# Patient Record
Sex: FEMALE | Race: WHITE | Employment: UNEMPLOYED | URBAN - METROPOLITAN AREA
[De-identification: names, ages, dates, MRNs, and addresses within clinical notes are randomized per-mention and may not be internally consistent; named-entity substitution may affect disease eponyms.]

---

## 2017-06-10 ENCOUNTER — OFFICE VISIT (OUTPATIENT)
Dept: OPHTHALMOLOGY | Facility: CLINIC | Age: 67
End: 2017-06-10

## 2017-06-10 DIAGNOSIS — H25.13 AGE-RELATED NUCLEAR CATARACT OF BOTH EYES: Primary | ICD-10-CM

## 2017-06-10 DIAGNOSIS — H43.393 VITREOUS FLOATERS OF BOTH EYES: ICD-10-CM

## 2017-06-10 PROCEDURE — 92014 COMPRE OPH EXAM EST PT 1/>: CPT | Performed by: OPHTHALMOLOGY

## 2017-06-10 RX ORDER — CHOLECALCIFEROL (VITAMIN D3) 1250 MCG
CAPSULE ORAL
COMMUNITY

## 2017-06-10 RX ORDER — THIAMINE HCL 100 MG
TABLET ORAL
COMMUNITY

## 2017-06-10 RX ORDER — CYANOCOBALAMIN (VITAMIN B-12) 2000 MCG
TABLET ORAL
COMMUNITY

## 2017-06-10 RX ORDER — OMEGA-3 FATTY ACIDS CAP DELAYED RELEASE 1000 MG 1000 MG
2 CAPSULE DELAYED RELEASE ORAL
COMMUNITY

## 2017-06-10 NOTE — PROGRESS NOTES
Kamryn Singh is a 78year old female. HPI:     HPI     Patient is here for a complete eye exam. Patient states no changes in her vision.        Last edited by Nikia Heart OSISSY on 6/10/2017  7:34 AM.     Patient History:  Past Medical History   Diag Slit Lamp Exam      Right Left    Lids/Lashes Dermatochalasis, Meibomian gland dysfunction Meibomian gland dysfunction, Dermatochalasis    Conjunctiva/Sclera Normal Normal    Cornea Clear Clear    Anterior Chamber Deep and quiet Deep and quiet    Iris Norm

## 2017-06-10 NOTE — PATIENT INSTRUCTIONS
Age-related nuclear cataract of both eyes  Discussed early cataracts with patient. No treatment recommended at this time. Vitreous floaters of both eyes  No treatment.

## 2019-04-25 ENCOUNTER — OFFICE VISIT (OUTPATIENT)
Dept: OPHTHALMOLOGY | Facility: CLINIC | Age: 69
End: 2019-04-25
Payer: MEDICARE

## 2019-04-25 DIAGNOSIS — G43.109 OCULAR MIGRAINE: ICD-10-CM

## 2019-04-25 DIAGNOSIS — H53.8 FLASHING LIGHTS SEEN: ICD-10-CM

## 2019-04-25 DIAGNOSIS — H25.13 AGE-RELATED NUCLEAR CATARACT OF BOTH EYES: Primary | ICD-10-CM

## 2019-04-25 DIAGNOSIS — H43.393 VITREOUS FLOATERS OF BOTH EYES: ICD-10-CM

## 2019-04-25 PROCEDURE — 92014 COMPRE OPH EXAM EST PT 1/>: CPT | Performed by: OPHTHALMOLOGY

## 2019-04-25 PROCEDURE — 92015 DETERMINE REFRACTIVE STATE: CPT | Performed by: OPHTHALMOLOGY

## 2019-04-25 NOTE — PATIENT INSTRUCTIONS
Age-related nuclear cataract of both eyes  Discussed mild cataracts in both eyes that are not affecting vision and are not surgical at this time. New glasses today; suggest update.    IF PATIENT IS UNHAPPY WITH NEW PRESCRIPTION, DR MAC'S BUSINESS CARD if you are nearsighted, you are more likely to see flashes. Nearsightedness is when you have fuzzy distance vision. Sometimes, flashes are a sign of other eye problems that need care. Who gets floaters?   The older you get, the more likely you’ll notice fl away. These may be symptoms of a retinal tear or detachment, which can cause loss of vision. You will need a complete, dilated eye exam to determine the problem. Date Last Reviewed: 10/1/2017  © 3544-0518 [employer].  [address]

## 2019-04-25 NOTE — ASSESSMENT & PLAN NOTE
Discussed mild cataracts in both eyes that are not affecting vision and are not surgical at this time. New glasses today; suggest update. IF PATIENT IS UNHAPPY WITH NEW PRESCRIPTION, DR MAC'S BUSINESS CARD GIVEN.   SHE SHOULD CALL WITHIN 1-2 WEEKS OF

## 2019-04-25 NOTE — PROGRESS NOTES
Yamile Jimenez is a 80year old female. HPI:     HPI     Consult      Additional comments: Consult per Dr. Priscilla Guallpa              Comments     Patient is here for a complete exam.  She complains of a white flash of light in her left eye for about 6 months. Negative for: Constitutional, Gastrointestinal, Neurological, Skin, Genitourinary, Musculoskeletal, HENT, Endocrine, Cardiovascular, Respiratory, Psychiatric, Allergic/Imm, Heme/Lymph    Last edited by Nicole Huynh OT on 4/25/2019  1:50 PM. (History) Cylinder [place] [facility] [place] Add Near [facility]    Right +2.25 Sphere  20/20 +2.50 20/20    Left +1.25 +0.75 180 20/20 +2.50 20/20    Type:  Progressive bifocal                 ASSESSMENT/PLAN:     Diagnoses and Plan:     Age-related nuclear cataract of both eyes  Discu

## 2019-04-25 NOTE — ASSESSMENT & PLAN NOTE
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 There is no retinal pathology in either eye.   Discussed that patient should follow up with their primary care physician if symptoms worsen or persist.  Ophthalmic mi

## 2021-07-01 ENCOUNTER — OFFICE VISIT (OUTPATIENT)
Dept: OPHTHALMOLOGY | Facility: CLINIC | Age: 71
End: 2021-07-01
Payer: MEDICARE

## 2021-07-01 DIAGNOSIS — H25.13 AGE-RELATED NUCLEAR CATARACT OF BOTH EYES: Primary | ICD-10-CM

## 2021-07-01 DIAGNOSIS — H43.393 VITREOUS FLOATERS OF BOTH EYES: ICD-10-CM

## 2021-07-01 PROCEDURE — 92015 DETERMINE REFRACTIVE STATE: CPT | Performed by: OPHTHALMOLOGY

## 2021-07-01 PROCEDURE — 92014 COMPRE OPH EXAM EST PT 1/>: CPT | Performed by: OPHTHALMOLOGY

## 2021-07-01 NOTE — PATIENT INSTRUCTIONS
Age-related nuclear cataract of both eyes   Discussed diagnosis of cataracts in detail with patient. Cataract surgery not indicated at this time due to vision level. Will continue to monitor yearly.   Patient will call sooner than 1 year recall if they severely blur your vision. Daniel last reviewed this educational content on 8/1/2020  © 0268-2485 [employer]. All rights reserved. This information is not intended as a substitute for professional medical care.  Always follow your healthca

## 2021-07-01 NOTE — PROGRESS NOTES
Marti Estevez is a 80year old female. HPI:     HPI     Patient is here for a complete exam.  Patient feels like her vision has gotten worse since she started wearing her current glasses in 2019.   She complains of difficulty seeing out of her progressiv Cardiovascular, Respiratory, Psychiatric, Allergic/Imm, Heme/Lymph    Last edited by Janeth Collins OT on 7/1/2021  1:23 PM. (History)          PHYSICAL EXAM:     Base Eye Exam     Visual Acuity (Snellen - Linear)       Right Left    Dist cc 20/25 +2 20/2 Sphere Cylinder [place] Dist [place] Add Near [place]    Right +5.25 Sphere    20/20    Left +4.00 +0.75 180   20/20    Type: Reading only                 ASSESSMENT/PLAN:     Diagnoses and Plan:     Age-related nuclear cataract of both eyes   Discussed diagnosis

## 2021-09-03 DIAGNOSIS — Z11.59 SPECIAL SCREENING EXAMINATION FOR UNSPECIFIED VIRAL DISEASE: Primary | ICD-10-CM

## 2021-09-08 ENCOUNTER — LAB SERVICES (OUTPATIENT)
Dept: LAB | Age: 71
End: 2021-09-08

## 2021-09-08 DIAGNOSIS — Z11.59 SPECIAL SCREENING EXAMINATION FOR UNSPECIFIED VIRAL DISEASE: ICD-10-CM

## 2021-09-08 PROCEDURE — U0003 INFECTIOUS AGENT DETECTION BY NUCLEIC ACID (DNA OR RNA); SEVERE ACUTE RESPIRATORY SYNDROME CORONAVIRUS 2 (SARS-COV-2) (CORONAVIRUS DISEASE [COVID-19]), AMPLIFIED PROBE TECHNIQUE, MAKING USE OF HIGH THROUGHPUT TECHNOLOGIES AS DESCRIBED BY CMS-2020-01-R: HCPCS | Performed by: INTERNAL MEDICINE

## 2021-09-08 PROCEDURE — U0005 INFEC AGEN DETEC AMPLI PROBE: HCPCS | Performed by: INTERNAL MEDICINE

## 2021-09-09 LAB
SARS-COV-2 RNA RESP QL NAA+PROBE: NOT DETECTED
SERVICE CMNT-IMP: NORMAL
SERVICE CMNT-IMP: NORMAL

## 2021-09-10 PROCEDURE — 88305 TISSUE EXAM BY PATHOLOGIST: CPT | Performed by: CLINICAL MEDICAL LABORATORY

## 2021-09-11 ENCOUNTER — LAB REQUISITION (OUTPATIENT)
Dept: LAB | Age: 71
End: 2021-09-11

## 2021-09-11 DIAGNOSIS — R19.7 DIARRHEA, UNSPECIFIED: ICD-10-CM

## 2021-09-11 DIAGNOSIS — Z12.11 ENCOUNTER FOR SCREENING FOR MALIGNANT NEOPLASM OF COLON: ICD-10-CM

## 2021-09-15 LAB
ASR DISCLAIMER: NORMAL
CASE RPRT: NORMAL
CLINICAL INFO: NORMAL
PATH REPORT.FINAL DX SPEC: NORMAL
PATH REPORT.GROSS SPEC: NORMAL

## 2024-03-11 ENCOUNTER — TELEPHONE (OUTPATIENT)
Dept: OPHTHALMOLOGY | Facility: CLINIC | Age: 74
End: 2024-03-11

## 2024-03-11 NOTE — TELEPHONE ENCOUNTER
Patient calling to ensure that fax sent for tomorrow appointment was received, please update through [employer], thanks.

## 2024-03-12 ENCOUNTER — OFFICE VISIT (OUTPATIENT)
Dept: OPHTHALMOLOGY | Facility: CLINIC | Age: 74
End: 2024-03-12
Payer: MEDICARE

## 2024-03-12 DIAGNOSIS — H25.13 AGE-RELATED NUCLEAR CATARACT OF BOTH EYES: Primary | ICD-10-CM

## 2024-03-12 DIAGNOSIS — H43.393 VITREOUS FLOATERS OF BOTH EYES: ICD-10-CM

## 2024-03-12 PROCEDURE — 1160F RVW MEDS BY RX/DR IN RCRD: CPT | Performed by: OPHTHALMOLOGY

## 2024-03-12 PROCEDURE — 1159F MED LIST DOCD IN RCRD: CPT | Performed by: OPHTHALMOLOGY

## 2024-03-12 PROCEDURE — 1126F AMNT PAIN NOTED NONE PRSNT: CPT | Performed by: OPHTHALMOLOGY

## 2024-03-12 PROCEDURE — 92015 DETERMINE REFRACTIVE STATE: CPT | Performed by: OPHTHALMOLOGY

## 2024-03-12 PROCEDURE — 92014 COMPRE OPH EXAM EST PT 1/>: CPT | Performed by: OPHTHALMOLOGY

## 2024-03-12 NOTE — PATIENT INSTRUCTIONS
Age-related nuclear cataract of both eyes  Discussed early cataracts with patient.  No treatment recommended at this time.     New glasses Rx given today, update as needed    Will see patient in 1 year for a complete exam    Vitreous floaters of both eyes   There is no evidence of retinal pathology.  All signs and symptoms of retinal detachment/tears explained in detail.    Patient instructed to call the office if they experience increase in floaters, increase in flashes of light, loss of vision or curtain or veil effect.

## 2024-03-12 NOTE — ASSESSMENT & PLAN NOTE
Discussed early cataracts with patient.  No treatment recommended at this time.     New glasses Rx given today, update as needed    Will see patient in 1 year for a complete exam

## 2024-03-12 NOTE — PROGRESS NOTES
Britni Frank is a 73 year old female.    HPI:     HPI    Patient is here for a complete eye exam. She has a complaint of some decreased vision and would like an updated rx today.   Last edited by Fanta Reese OT on 3/12/2024  2:57 PM.        Patient History:  Past Medical History:   Diagnosis Date    Benign essential tremor     Cataract 1988    OD    Cataract 1988    OS    Ocular hypertension 2010    Ocular hypertension 2010    OU    Wears glasses        Surgical History: Britni Frank has no past surgical history on file.    Family History   Problem Relation Age of Onset    Cancer Mother     Glaucoma Mother     Other (Other) Daughter         essential tremor    Diabetes Father     Blindness Other         Uncle    Macular degeneration Neg         multiple       Social History:   Social History     Socioeconomic History    Marital status:    Tobacco Use    Smoking status: Never    Smokeless tobacco: Never   Vaping Use    Vaping Use: Never used   Substance and Sexual Activity    Alcohol use: Yes     Comment: occasional    Drug use: No       Medications:  Current Outpatient Medications   Medication Sig Dispense Refill    primidone (MYSOLINE) 50 MG Oral Tab Take 2 tablets (100 mg total) by mouth nightly. 180 tablet 3    Propranolol HCl ER (INDERAL LA) 160 MG Oral Capsule SR 24 Hr 1 po daily 90 capsule 1    Magnesium 500 MG Oral Tab Take by mouth.      Omega-3 Fatty Acids (FISH OIL) 1000 MG Oral Capsule Delayed Release Take 2 capsules by mouth.      Garlic 100 MG Oral Tab Take by mouth.      Cyanocobalamin (B-12) 2000 MCG Oral Tab Take by mouth.      Cholecalciferol (VITAMIN D3) 21963 units Oral Cap Take by mouth.         Allergies:  No Known Allergies    ROS:       PHYSICAL EXAM:     Base Eye Exam       Visual Acuity (Snellen - Linear)         Right Left    Dist cc 20/25 -2 20/25    Near cc 20/30 20/25      Correction: Glasses              Tonometry ([facility]care, 2:30 PM)         Right Left    Pressure 14 13               Pupils         Pupils    Right PERRL    Left PERRL              Visual Fields         Left Right     Full Full              Extraocular Movement         Right Left     Full, Ortho Full, Ortho              Neuro/Psych       Oriented x3: Yes    Mood/Affect: Normal              Dilation       Both eyes: 1.0% Mydriacyl and 2.5% Drew Synephrine @ 2:29 PM                  Slit Lamp and Fundus Exam       Slit Lamp Exam         Right Left    Lids/Lashes Dermatochalasis, Meibomian gland dysfunction Dermatochalasis, Meibomian gland dysfunction    Conjunctiva/Sclera Normal Normal    Cornea 2+ Arcus 2+ Arcus    Anterior Chamber Deep and quiet Deep and quiet    Iris Normal Normal    Lens 2+ Nuclear sclerosis, Trace Cortical cataract 2+ Nuclear sclerosis, Trace Cortical cataract    Vitreous Vitreous floaters Vitreous floaters              Fundus Exam         Right Left    Disc Good rim, Temporal crescent Good rim, Temporal crescent    C/D Ratio 0.2 0.3    Macula Normal Normal    Vessels Normal Normal    Periphery Normal Normal                  Refraction       Wearing Rx         Sphere Cylinder Ax[place] Add    Right +2.25 Sphere  +2.50    Left +1.25 +0.75 180 +2.50      Age: 4yrs    Type: Progressive bifocal              Manifest Refraction         Sphere Cylinder [place] [facility]    Right +2.50 Sphere  20/25- +2.50 20/30    Left +1.50 +0.75 180 20/25 +2.50 20/25              Final Rx         Sphere Cylinder [place] [facility] Add [facility]    Right +2.50 Sphere  20/25- +2.50 20/30    Left +1.50 +0.75 180 20/25 +2.50 20/25      Type: Progressive bifocal                     ASSESSMENT/PLAN:     Diagnoses and Plan:     Age-related nuclear cataract of both eyes  Discussed early cataracts with patient.  No treatment recommended at this time.     New glasses Rx given today, update as needed    Will see patient in 1 year for a complete exam    Vitreous floaters of both eyes   There is no evidence of retinal pathology.  All signs and  symptoms of retinal detachment/tears explained in detail.    Patient instructed to call the office if they experience increase in floaters, increase in flashes of light, loss of vision or curtain or veil effect.       No orders of the defined types were placed in this encounter.      Meds This Visit:  Requested Prescriptions      No prescriptions requested or ordered in this encounter        Follow up instructions:  Return in about 1 year (around 3/12/2025) for complete exam.    3/12/2024  Scribed by: Serjio Mendez MD

## (undated) NOTE — LETTER
April 25, 2019    Rachelle Sidhu MD  [address] [place] [address]     Patient: Paloma Casiano   YOB: 1950   Date of Visit: 4/25/2019       Dear Dr. Wilde Sos: Thank you for referring Paloma Casiano to me for evaluation.  Here capsules by mouth. Disp:  Rfl:    Garlic 367 MG Oral Tab Take by mouth. Disp:  Rfl:    Cyanocobalamin (B-12) 2000 MCG Oral Tab Take by mouth. Disp:  Rfl:    Cholecalciferol (VITAMIN D3) 18180 units Oral Cap Take by mouth.  Disp:  Rfl:        Allergies:  No Wearing Rx #2       Sphere Cylinder Axis Add    Right +2.50 Sphere      Left +2.50 Sphere      Type:  OTC reading only          Manifest Refraction       Sphere Cylinder [place] Dist [facility] Add Near [place]    Right +2.25 Sphere  20/20 +2.50 20/20    Left +1.25 No prescriptions requested or ordered in this encounter        Follow up instructions:  Return in about 1 year (around 4/25/2020) for Complete eye exam.    4/25/2019  Scribed by: Eyad Frye MD      If you have questions, please do not hesitate to ca

## (undated) NOTE — MR AVS SNAPSHOT
[facility]  [address]  [phone]               Thank you for choosing us for your health care visit with Stephanie Stover MD.  We are glad to serve you and happy to provide you with this summa Enter your [employer] Activation Code exactly as it appears below along with your Zip Code and Date of Birth to complete the sign-up process. If you do not sign up before the expiration date, you must request a new code.     Your unique [employer] Access Code: MT Visit [facility] online at  [email or web address]